# Patient Record
(demographics unavailable — no encounter records)

---

## 2024-11-01 NOTE — HISTORY OF PRESENT ILLNESS
[FreeTextEntry1] : headaches, nausea, dizziness, neck pain, arm symptoms, back pain, leg symptoms [de-identified] : Mr. Jamaal Pyle is a very pleasant 47 year old male with a history of prior C5-7 ACDF in 2023 and L5-S1 disc replacement in 2016 who presents with a multitude of symptoms: headaches, nausea, dizziness, neck pain, arm symptoms, back pain, leg symptoms.  He reports that about 8 months ago, he first began having some pain in the back of his neck.  This is progressed to involve his shoulders and has been significantly worsening to the point where he just wants to pull his head off because the pain is so severe.  Currently his pain is rated 7 out of 10.  He also notes numbness on the inside of his arms into his hands.  He also has some right elbow pain.  The numbness has been ongoing for the past few months.  He also has been having headaches, pain at the base of his skull, pain in his right ear, dizziness, and nausea for the past 2 weeks to the point where he had to go to [facility] a few days ago for evaluation.  He underwent a C5-6 ACDF at [facility] in 2023 for numbness and tingling in his arms and shoulders; his symptoms improved after surgery.  With regards to his low back, he underwent an L5-S1 disc replacement surgery in 2016 at [facility] for numbness and tingling in his right leg which improved after surgery.  Currently he reports pain across his low back that is rated 6 out of 10.  He also has numbness tingling in both of his legs right worse than his left.  The pain radiates down the outside of his leg to his ankle to the bottom of his feet.  He also has a sensation of feeling cold.  Any type of movement makes his pain worse.  Nothing makes the pain better.  He has tried some Tylenol and Aleve without much improvement.  He has not tried a muscle relaxer.  No recent physical therapy or pain injections.  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

## 2024-11-01 NOTE — PHYSICAL EXAM
[General Appearance - Alert] : alert [General Appearance - In No Acute Distress] : in no acute distress [Oriented To Time, Place, And Person] : oriented to person, place, and time [Cranial Nerves Oculomotor (III)] : extraocular motion intact [Cranial Nerves Facial (VII)] : face symmetrical [Motor Tone] : muscle tone was normal in all four extremities [Motor Strength] : muscle strength was normal in all four extremities [Abnormal Walk] : normal gait [FreeTextEntry6] : BUE/BLE 5/5 strength [FreeTextEntry8] : Able to walk on heels and toes [FreeTextEntry9] : No Escalera's, 2+ reflexes throughout

## 2024-11-01 NOTE — ASSESSMENT
[FreeTextEntry1] : Mr. Jamaal Pyle is a very pleasant 47 year old male with a history of prior C5-7 ACDF in 2023 and L5-S1 disc replacement in 2016 who presents with a multitude of symptoms: headaches, nausea, dizziness, neck pain, arm symptoms, back pain, leg symptoms.  With regards to his headaches, nausea, and dizziness, I have ordered a brain MRI to evaluate for a cause for the symptoms.  I have also placed an order for scopolamine patch to help with his nausea and would like him to start vestibular therapy.  For his neck and back and extremity symptoms, I have ordered an MRI of his cervical and lumbar spine.  He also showed me an x-ray that he got done at his chiropractor earlier this week which demonstrates a cervical thoracic coronal scoliosis so I have ordered scoliosis x-rays to further evaluate.  I would like him to start physical therapy and have referred him to pain management.  I will send him a prescription for some tizanidine to trial. I will see him back in 2 to 4 weeks at his request instead about 6 weeks. All questions answered.  He knows to call my office with any issues or concerns.

## 2024-11-11 NOTE — HISTORY OF PRESENT ILLNESS
[FreeTextEntry1] : Mr. Jamaal Pyle is a very pleasant 47 year old male with a history of prior C5-7 ACDF in 2023 and L5-S1 disc replacement in 2016 who presents for follow-up of a multitude of symptoms: headaches, nausea, dizziness, neck pain, arm symptoms, back pain, leg symptoms. He was last seen in the office at the beginning of the month and was recommended for conservative therapy. Overall his symptoms are the same. His pain in his neck currently is 4/10; improved with steroids and medications but today is his last day of his steroids. He is currently on diclofenac, pregabalin, and tizanidine. His pain management doctor has recommended a EMG/NCS.  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

## 2024-11-11 NOTE — ASSESSMENT
[FreeTextEntry1] : Mr. Jamaal Pyle is a very pleasant 47 year old male with a history of prior C5-7 ACDF in 2023 and L5-S1 disc replacement in 2016 who presents for follow-up of a multitude of symptoms: headaches, nausea, dizziness, neck pain, arm symptoms, back pain, leg symptoms. I reviewed with him his brain, cervical spine, and lumbar spine MRI which do not show a structural cause for his pain. His cervical and lumbar spine MRI have very minimal arthritis. My recommendation at this time is to proceed with the EMG/NCS that is already scheduled as well as referral to neurology. I did preliminarily discuss with him that even if the EMG/NCS shows a nerve issue, there is not a role for surgery as there is not a structural issue. His thoracic xrays demonstrate a levoscoliosis and I discussed with him that the only treatment for this is PT to strengthen his muscles to help improve his curvature. I have given him an updated prescription. I will see him after the holidays to see how he is doing. All questions answered.  He knows to call my office with any issues or concerns.

## 2024-12-16 NOTE — REVIEW OF SYSTEMS
[Negative] : Heme/Lymph [Feeling Tired] : feeling tired [Recent Weight Gain (___ Lbs)] : recent [unfilled] ~Ulb weight gain [Eyesight Problems] : eyesight problems [Dry Eyes] : dryness of the eyes [Abdominal Pain] : abdominal pain [Loss of interest] : loss of interest in sexual activity [Seen by urologist before (Name)  ___] : Preciously seen by a urologist: [unfilled] [Strain or push to urinate] : strain or push to urinate [Slow urine stream] : slow urine stream [Interrupted urine stream] : interrupted urine stream [Joint Pain] : joint pain [Anxiety] : anxiety [see HPI] : see HPI [Muscle Weakness] : muscle weakness [Feelings Of Weakness] : feelings of weakness [FreeTextEntry2] : frequent urination

## 2024-12-16 NOTE — ASSESSMENT
[FreeTextEntry1] : Uroflow-low flow 30mls PVR -3ml  After a discussion of his urologic history and exam, and a review the urological issues,   1.kidney stones - -CT scan (12/2024)R Non obstructing stone, patient now asymptomatic. Repeat US 6 months, er warning signs reviewed, dietary changes reviewed.   2. BPH/LUTS- start low dose cialis for urgency/frequency, risks reviewed  3.PSA screening-PSA drawn today given urinary symptoms  4.ED- pt deferred for now.   The risks and benefits of the medication(s) and/or treatment plan including various surgical therapies were discussed in detail with the patient who understands and wishes to proceed with plan. Inclusive of discussion were the impact of various therapies on sexual function, incontinence and other relevant quality of life issues. More than 50% was spent on counseling/coordination the care of the patient, regarding treatment options/surgical management.

## 2024-12-16 NOTE — PHYSICAL EXAM
[General Appearance - Well Developed] : well developed [General Appearance - Well Nourished] : well nourished [Heart Rate And Rhythm] : heart rate and rhythm were normal [] : no respiratory distress [Bowel Sounds] : normal bowel sounds [Abdomen Soft] : soft [Normal Station and Gait] : the gait and station were normal for the patient's age [Skin Color & Pigmentation] : normal skin color and pigmentation [No Focal Deficits] : no focal deficits [Oriented To Time, Place, And Person] : oriented to person, place, and time [Chaperone Present] : A chaperone was present in the examining room during all aspects of the physical examination [de-identified] : pt deferred [FreeTextEntry2] : Amena Torrez NP

## 2024-12-16 NOTE — HISTORY OF PRESENT ILLNESS
[Urinary Urgency] : urinary urgency [Urinary Frequency] : urinary frequency [Post-Void Dribbling] : post-void dribbling [FreeTextEntry1] : RAZIA DE LEON is a 47 year male who presents to the office with recent hospitalization for right flank pain CT scan (12/2024) showing non obstructing L renal calculus 2mm. He is now asymptomatic.   also states he has symptoms of urgency, frequency and post void dribbling. IPSS 13/4   The patient is sexually active and states he has erectile dysfunction ROLAND 13                                 [Urinary Incontinence] : no urinary incontinence [Urinary Retention] : no urinary retention [Nocturia] : no nocturia [Straining] : no straining [Weak Stream] : no weak stream [Intermittency] : no intermittency

## 2025-01-27 NOTE — ASSESSMENT
[FreeTextEntry1] : Mr. Jamaal Pyle is a very pleasant 47 year old male with a history of prior C5-7 ACDF in 2023 and L5-S1 disc replacement in 2016 who presents for follow-up of a multitude of symptoms: headaches, nausea, dizziness, neck pain, arm symptoms, back pain, leg symptoms. He returns today for worsening of his neck and arm symptoms and is interested in surgery. I had a long conversation with him explaining to him again that because I do not see a structural cause for his symptoms, there is no surgery that I could offer as I do not know what surgery to even perform nor would I think that any surgery would help his symptoms. He asked about surgery for his scoliosis and I told him that while I could do surgery to correct his upper thoracic curvature, because the curvature is below the level of his cervical spine, the curvature does not at all explain his neck or shoulder/arm symptoms and thus corrective surgery would not help at all for these symptoms. We explicitly discussed that unfortunately do not know the cause of his symptoms and thus my recommendation is continued PT, which my office will email exercises for, as well as continued pain management. He would like an MRI of his thoracic spine to look at the nerves around the curvature so I have ordered that. I offered scheduled follow-up but he would like to call to schedule after seeing pain management. All questions answered.  He knows to call my office with any issues or concerns.

## 2025-01-27 NOTE — HISTORY OF PRESENT ILLNESS
[FreeTextEntry1] : Mr. Jamaal Pyle is a very pleasant 47 year old male with a history of prior C5-7 ACDF in 2023 and L5-S1 disc replacement in 2016 who presents for follow-up of a multitude of symptoms: headaches, nausea, dizziness, neck pain, arm symptoms, back pain, leg symptoms. He was last seen in the office in mid November and at that time was improved. He reports recent worsening of his symptoms with severe pain in his neck, right shoulder and left arm tingling/numbness that can keep him from being able to sleep. He has not yet undergone EMG/NCS. He is not doing PT. 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